# Patient Record
Sex: FEMALE | Race: WHITE | Employment: PART TIME | URBAN - NONMETROPOLITAN AREA
[De-identification: names, ages, dates, MRNs, and addresses within clinical notes are randomized per-mention and may not be internally consistent; named-entity substitution may affect disease eponyms.]

---

## 2017-08-14 ENCOUNTER — OFFICE VISIT (OUTPATIENT)
Dept: URGENT CARE | Facility: CLINIC | Age: 62
End: 2017-08-14
Payer: COMMERCIAL

## 2017-08-14 PROCEDURE — 12001 RPR S/N/AX/GEN/TRNK 2.5CM/<: CPT

## 2017-08-14 PROCEDURE — S9088 SERVICES PROVIDED IN URGENT: HCPCS

## 2017-08-14 PROCEDURE — 99203 OFFICE O/P NEW LOW 30 MIN: CPT

## 2018-12-06 ENCOUNTER — APPOINTMENT (EMERGENCY)
Dept: NON INVASIVE DIAGNOSTICS | Facility: HOSPITAL | Age: 63
End: 2018-12-06
Payer: MEDICARE

## 2018-12-06 ENCOUNTER — HOSPITAL ENCOUNTER (EMERGENCY)
Facility: HOSPITAL | Age: 63
Discharge: HOME | End: 2018-12-06
Attending: EMERGENCY MEDICINE
Payer: MEDICARE

## 2018-12-06 ENCOUNTER — APPOINTMENT (EMERGENCY)
Dept: RADIOLOGY | Facility: HOSPITAL | Age: 63
End: 2018-12-06
Payer: MEDICARE

## 2018-12-06 VITALS
BODY MASS INDEX: 25.77 KG/M2 | TEMPERATURE: 99.1 F | SYSTOLIC BLOOD PRESSURE: 137 MMHG | HEART RATE: 70 BPM | HEIGHT: 69 IN | DIASTOLIC BLOOD PRESSURE: 62 MMHG | OXYGEN SATURATION: 96 % | RESPIRATION RATE: 16 BRPM | WEIGHT: 174 LBS

## 2018-12-06 DIAGNOSIS — S80.01XA CONTUSION OF RIGHT KNEE, INITIAL ENCOUNTER: Primary | ICD-10-CM

## 2018-12-06 PROCEDURE — 73562 X-RAY EXAM OF KNEE 3: CPT

## 2018-12-06 PROCEDURE — 93971 EXTREMITY STUDY: CPT

## 2018-12-06 PROCEDURE — 99284 EMERGENCY DEPT VISIT MOD MDM: CPT

## 2018-12-06 PROCEDURE — 93971 EXTREMITY STUDY: CPT | Performed by: SURGERY

## 2018-12-06 RX ORDER — LOSARTAN POTASSIUM 50 MG/1
25 TABLET ORAL DAILY
COMMUNITY

## 2018-12-06 RX ORDER — LEVOTHYROXINE SODIUM 0.05 MG/1
50 TABLET ORAL DAILY
COMMUNITY

## 2018-12-06 RX ORDER — TRAZODONE HYDROCHLORIDE 100 MG/1
50 TABLET ORAL
COMMUNITY

## 2018-12-06 RX ORDER — TRAMADOL HYDROCHLORIDE 50 MG/1
50 TABLET ORAL EVERY 8 HOURS PRN
Qty: 15 TABLET | Refills: 0 | Status: SHIPPED | OUTPATIENT
Start: 2018-12-06 | End: 2018-12-21

## 2018-12-06 RX ORDER — TRAMADOL HYDROCHLORIDE 50 MG/1
50 TABLET ORAL ONCE
Status: COMPLETED | OUTPATIENT
Start: 2018-12-06 | End: 2018-12-06

## 2018-12-06 RX ORDER — HYDROCHLOROTHIAZIDE 25 MG/1
25 TABLET ORAL 2 TIMES DAILY
COMMUNITY

## 2018-12-06 RX ADMIN — TRAMADOL HYDROCHLORIDE 50 MG: 50 TABLET, COATED ORAL at 10:50

## 2018-12-06 NOTE — DISCHARGE INSTRUCTIONS

## 2018-12-06 NOTE — ED PROVIDER NOTES
History  Chief Complaint   Patient presents with    Knee Pain     right knee pain    Fall     fall 3 weeks ago     52-year-old female who fell onto her her right knee 3 weeks ago after her dog pulled her down now with constant right knee pain  Patient states the swelling and bruising has gone down but 3 days ago she started developing pain in her right posterior thigh and is worried about a clot  No calf pain  No chest pain or shortness of breath  No distal paresthesias  No fever chills  No nausea vomiting  No other injuries  History provided by:  Patient  Knee Pain   Location:  Knee  Pain details:     Quality:  Aching    Timing:  Constant  Relieved by:  Nothing  Worsened by:  Bearing weight  Associated symptoms: no back pain, no fever, no neck pain and no tingling    Fall   Associated symptoms: no back pain, no chest pain, no nausea, no neck pain and no vomiting        Prior to Admission Medications   Prescriptions Last Dose Informant Patient Reported? Taking? BusPIRone HCl (BUSPAR PO)   Yes Yes   Sig: Take 30 mg by mouth 2 (two) times a day   Venlafaxine HCl (EFFEXOR XR PO)   Yes Yes   Sig: Take 225 mg by mouth daily   hydrochlorothiazide (HYDRODIURIL) 25 mg tablet   Yes Yes   Sig: Take 25 mg by mouth 2 (two) times a day   levothyroxine 50 mcg tablet   Yes Yes   Sig: Take 50 mcg by mouth daily   losartan (COZAAR) 50 mg tablet   Yes Yes   Sig: Take 25 mg by mouth daily   traZODone (DESYREL) 100 mg tablet   Yes Yes   Sig: Take 50 mg by mouth daily at bedtime      Facility-Administered Medications: None       Past Medical History:   Diagnosis Date    Depression     Factor 5 Leiden mutation, heterozygous ([facility] [address] )        History reviewed  No pertinent surgical history  History reviewed  No pertinent family history  I have reviewed and agree with the history as documented      Social History   Substance Use Topics    Smoking status: Never Smoker    Smokeless tobacco: Former User    Alcohol use 0 6 oz/week     1 Glasses of wine per week      Comment: occassional        Review of Systems   Constitutional: Negative for chills and fever  Respiratory: Negative for shortness of breath  Cardiovascular: Negative for chest pain  Gastrointestinal: Negative for nausea and vomiting  Musculoskeletal: Negative for back pain and neck pain  Right knee and thigh pain   Skin: Negative for wound  Neurological: Negative for weakness and numbness  Physical Exam  Physical Exam   Constitutional: She is oriented to person, place, and time  She appears well-developed and well-nourished  Neck: Neck supple  Cardiovascular: Normal rate, regular rhythm and normal heart sounds  No murmur heard  Pulmonary/Chest: Effort normal and breath sounds normal  No respiratory distress  She has no wheezes  She has no rales  Musculoskeletal: Normal range of motion  Right knee tender with old ecchymosis anteriorly, mild edema, no effusion, ligaments intact  No calf tenderness  Mild right posterior thigh tenderness  Neurovascular intact distally  Skin is intact   Neurological: She is alert and oriented to person, place, and time  She exhibits normal muscle tone  Skin: Skin is warm and dry  Psychiatric: She has a normal mood and affect  Her behavior is normal    Nursing note and vitals reviewed        Vital Signs  ED Triage Vitals [12/06/18 1040]   Temperature Pulse Respirations Blood Pressure SpO2   99 1 °F (37 3 °C) 94 16 (!) 189/87 97 %      Temp Source Heart Rate Source Patient Position - Orthostatic VS BP Location FiO2 (%)   Temporal Monitor Sitting Left arm --      Pain Score       5           Vitals:    12/06/18 1040 12/06/18 1230   BP: (!) 189/87 137/62   Pulse: 94 70   Patient Position - Orthostatic VS: Sitting Sitting       Visual Acuity      ED Medications  Medications   traMADol (ULTRAM) tablet 50 mg (50 mg Oral Given 12/6/18 1050)       Diagnostic Studies  Results Reviewed     None XR knee 3 views Right non injury   Final Result by Myles Lee (12/06 1227)   No acute osseous abnormality  Signed by Myles Lee MD      VAS lower limb venous duplex study, unilateral/limited    (Results Pending)              Procedures  Procedures       Phone Contacts  ED Phone Contact    ED Course  ED Course as of Dec 06 1235   Thu Dec 06, 2018   1128 Right leg venous doppler - no DVT    1225 Right knee - DJD, no Fx    1227 Pt declines knee immobilizer  Will see her orthopedist at [address] Henrietta Arreola Time    Disposition  Final diagnoses:   Contusion of right knee, initial encounter     Time reflects when diagnosis was documented in both MDM as applicable and the Disposition within this note     Time User Action Codes Description Comment    12/6/2018 11:53 AM Erin REYEZ Add [S80 [id number]] Contusion of right knee, initial encounter       ED Disposition     ED Disposition Condition Comment    Discharge  Nesagar Angel discharge to home/self care  Condition at discharge: Stable        Follow-up Information     Follow up With Specialties Details Why MD [facility] Schedule an appointment as soon as possible for a visit in 1 day Take Ultram as directed  Return if you worsen or any new problems occur   [facility]            Discharge Medication List as of 12/6/2018 12:27 PM      START taking these medications    Details   traMADol (ULTRAM) 50 mg tablet Take 1 tablet (50 mg total) by mouth every 8 (eight) hours as needed for moderate pain for up to 15 days, Starting Thu 12/6/2018, Until Fri 12/21/2018, Print         CONTINUE these medications which have NOT CHANGED    Details   BusPIRone HCl (BUSPAR PO) Take 30 mg by mouth 2 (two) times a day, Historical Med      hydrochlorothiazide (HYDRODIURIL) 25 mg tablet Take 25 mg by mouth 2 (two) times a day, Historical Med      levothyroxine 50 mcg tablet Take 50 mcg by mouth daily, Historical Med      losartan (COZAAR) 50 mg tablet Take 25 mg by mouth daily, Historical Med      traZODone (DESYREL) 100 mg tablet Take 50 mg by mouth daily at bedtime, Historical Med      Venlafaxine HCl (EFFEXOR XR PO) Take 225 mg by mouth daily, Historical Med           No discharge procedures on file      ED Provider  Electronically Signed by           Brie Dickinson MD  12/06/18 4685